# Patient Record
Sex: FEMALE | RURAL
[De-identification: names, ages, dates, MRNs, and addresses within clinical notes are randomized per-mention and may not be internally consistent; named-entity substitution may affect disease eponyms.]

---

## 2023-08-29 ENCOUNTER — HOSPITAL ENCOUNTER (EMERGENCY)
Facility: HOSPITAL | Age: 38
Discharge: ANOTHER HOSPITAL | End: 2023-08-30
Attending: EMERGENCY MEDICINE
Payer: COMMERCIAL

## 2023-08-29 DIAGNOSIS — K83.1 OBSTRUCTIVE JAUNDICE: Primary | ICD-10-CM

## 2023-08-29 PROBLEM — K80.01 CALCULUS OF GALLBLADDER WITH ACUTE CHOLECYSTITIS AND OBSTRUCTION: Status: ACTIVE | Noted: 2023-08-29

## 2023-08-29 PROBLEM — K80.42 CHOLEDOCHOLITHIASIS WITH ACUTE CHOLECYSTITIS: Status: ACTIVE | Noted: 2023-08-29

## 2023-08-29 LAB
ALBUMIN SERPL BCP-MCNC: 3.4 G/DL (ref 3.5–5)
ALBUMIN/GLOB SERPL: 0.8 {RATIO}
ALP SERPL-CCNC: 272 U/L (ref 37–98)
ALT SERPL W P-5'-P-CCNC: 396 U/L (ref 13–56)
AMYLASE SERPL-CCNC: 34 U/L (ref 25–115)
ANION GAP SERPL CALCULATED.3IONS-SCNC: 18 MMOL/L (ref 7–16)
AST SERPL W P-5'-P-CCNC: 125 U/L (ref 15–37)
B-HCG UR QL: NEGATIVE
BACTERIA #/AREA URNS HPF: ABNORMAL /HPF
BASOPHILS # BLD AUTO: 0.03 K/UL (ref 0–0.2)
BASOPHILS NFR BLD AUTO: 0.4 % (ref 0–1)
BILIRUB SERPL-MCNC: 5.8 MG/DL (ref ?–1.2)
BILIRUB UR QL STRIP: 2
BUN SERPL-MCNC: 16 MG/DL (ref 7–18)
BUN/CREAT SERPL: 20 (ref 6–20)
CALCIUM SERPL-MCNC: 9.7 MG/DL (ref 8.5–10.1)
CHLORIDE SERPL-SCNC: 103 MMOL/L (ref 98–107)
CLARITY UR: CLEAR
CO2 SERPL-SCNC: 21 MMOL/L (ref 21–32)
COLOR UR: ABNORMAL
CREAT SERPL-MCNC: 0.8 MG/DL (ref 0.55–1.02)
CTP QC/QA: YES
DIFFERENTIAL METHOD BLD: ABNORMAL
EGFR (NO RACE VARIABLE) (RUSH/TITUS): 99 ML/MIN/1.73M2
EOSINOPHIL # BLD AUTO: 0.09 K/UL (ref 0–0.5)
EOSINOPHIL NFR BLD AUTO: 1.3 % (ref 1–4)
ERYTHROCYTE [DISTWIDTH] IN BLOOD BY AUTOMATED COUNT: 12.8 % (ref 11.5–14.5)
GLOBULIN SER-MCNC: 4.5 G/DL (ref 2–4)
GLUCOSE SERPL-MCNC: 99 MG/DL (ref 74–106)
GLUCOSE UR STRIP-MCNC: NORMAL MG/DL
HCT VFR BLD AUTO: 38.7 % (ref 38–47)
HGB BLD-MCNC: 13.3 G/DL (ref 12–16)
IMM GRANULOCYTES # BLD AUTO: 0.03 K/UL (ref 0–0.04)
IMM GRANULOCYTES NFR BLD: 0.4 % (ref 0–0.4)
KETONES UR STRIP-SCNC: 20 MG/DL
LEUKOCYTE ESTERASE UR QL STRIP: ABNORMAL
LIPASE SERPL-CCNC: 31 U/L (ref 73–393)
LYMPHOCYTES # BLD AUTO: 1.49 K/UL (ref 1–4.8)
LYMPHOCYTES NFR BLD AUTO: 20.7 % (ref 27–41)
MCH RBC QN AUTO: 29.5 PG (ref 27–31)
MCHC RBC AUTO-ENTMCNC: 34.4 G/DL (ref 32–36)
MCV RBC AUTO: 85.8 FL (ref 80–96)
MONOCYTES # BLD AUTO: 0.55 K/UL (ref 0–0.8)
MONOCYTES NFR BLD AUTO: 7.6 % (ref 2–6)
MPC BLD CALC-MCNC: 10.2 FL (ref 9.4–12.4)
MUCOUS, UA: ABNORMAL /LPF
NEUTROPHILS # BLD AUTO: 5.01 K/UL (ref 1.8–7.7)
NEUTROPHILS NFR BLD AUTO: 69.6 % (ref 53–65)
NITRITE UR QL STRIP: NEGATIVE
NRBC # BLD AUTO: 0 X10E3/UL
NRBC, AUTO (.00): 0 %
PH UR STRIP: 6 PH UNITS
PLATELET # BLD AUTO: 260 K/UL (ref 150–400)
POTASSIUM SERPL-SCNC: 3.5 MMOL/L (ref 3.5–5.1)
PROT SERPL-MCNC: 7.9 G/DL (ref 6.4–8.2)
PROT UR QL STRIP: NEGATIVE
RBC # BLD AUTO: 4.51 M/UL (ref 4.2–5.4)
RBC # UR STRIP: NEGATIVE /UL
RBC #/AREA URNS HPF: 3 /HPF
SODIUM SERPL-SCNC: 138 MMOL/L (ref 136–145)
SP GR UR STRIP: 1.01
SQUAMOUS #/AREA URNS LPF: ABNORMAL /HPF
UROBILINOGEN UR STRIP-ACNC: NORMAL MG/DL
WBC # BLD AUTO: 7.2 K/UL (ref 4.5–11)
WBC #/AREA URNS HPF: 3 /HPF

## 2023-08-29 PROCEDURE — 99284 PR EMERGENCY DEPT VISIT,LEVEL IV: ICD-10-PCS | Mod: ,,, | Performed by: SURGERY

## 2023-08-29 PROCEDURE — 87086 URINE CULTURE/COLONY COUNT: CPT | Performed by: NURSE PRACTITIONER

## 2023-08-29 PROCEDURE — 99285 EMERGENCY DEPT VISIT HI MDM: CPT | Mod: ,,, | Performed by: EMERGENCY MEDICINE

## 2023-08-29 PROCEDURE — 82150 ASSAY OF AMYLASE: CPT | Performed by: NURSE PRACTITIONER

## 2023-08-29 PROCEDURE — 25000003 PHARM REV CODE 250: Performed by: NURSE PRACTITIONER

## 2023-08-29 PROCEDURE — 63600175 PHARM REV CODE 636 W HCPCS: Performed by: NURSE PRACTITIONER

## 2023-08-29 PROCEDURE — 96376 TX/PRO/DX INJ SAME DRUG ADON: CPT

## 2023-08-29 PROCEDURE — 81001 URINALYSIS AUTO W/SCOPE: CPT | Performed by: NURSE PRACTITIONER

## 2023-08-29 PROCEDURE — 83690 ASSAY OF LIPASE: CPT | Performed by: NURSE PRACTITIONER

## 2023-08-29 PROCEDURE — 96361 HYDRATE IV INFUSION ADD-ON: CPT

## 2023-08-29 PROCEDURE — 99284 EMERGENCY DEPT VISIT MOD MDM: CPT | Mod: ,,, | Performed by: SURGERY

## 2023-08-29 PROCEDURE — 96365 THER/PROPH/DIAG IV INF INIT: CPT

## 2023-08-29 PROCEDURE — 99285 EMERGENCY DEPT VISIT HI MDM: CPT | Mod: 25

## 2023-08-29 PROCEDURE — 81025 URINE PREGNANCY TEST: CPT | Performed by: NURSE PRACTITIONER

## 2023-08-29 PROCEDURE — 99285 PR EMERGENCY DEPT VISIT,LEVEL V: ICD-10-PCS | Mod: ,,, | Performed by: EMERGENCY MEDICINE

## 2023-08-29 PROCEDURE — 85025 COMPLETE CBC W/AUTO DIFF WBC: CPT | Performed by: NURSE PRACTITIONER

## 2023-08-29 PROCEDURE — 96375 TX/PRO/DX INJ NEW DRUG ADDON: CPT

## 2023-08-29 PROCEDURE — 80053 COMPREHEN METABOLIC PANEL: CPT | Performed by: NURSE PRACTITIONER

## 2023-08-29 RX ORDER — SODIUM CHLORIDE 9 MG/ML
1000 INJECTION, SOLUTION INTRAVENOUS
Status: COMPLETED | OUTPATIENT
Start: 2023-08-29 | End: 2023-08-30

## 2023-08-29 RX ORDER — MORPHINE SULFATE 4 MG/ML
4 INJECTION, SOLUTION INTRAMUSCULAR; INTRAVENOUS
Status: COMPLETED | OUTPATIENT
Start: 2023-08-29 | End: 2023-08-29

## 2023-08-29 RX ORDER — ONDANSETRON 2 MG/ML
4 INJECTION INTRAMUSCULAR; INTRAVENOUS
Status: COMPLETED | OUTPATIENT
Start: 2023-08-29 | End: 2023-08-29

## 2023-08-29 RX ORDER — CITALOPRAM 20 MG/1
20 TABLET, FILM COATED ORAL DAILY
COMMUNITY

## 2023-08-29 RX ORDER — DEXTROAMPHETAMINE SACCHARATE, AMPHETAMINE ASPARTATE MONOHYDRATE, DEXTROAMPHETAMINE SULFATE AND AMPHETAMINE SULFATE 7.5; 7.5; 7.5; 7.5 MG/1; MG/1; MG/1; MG/1
20 CAPSULE, EXTENDED RELEASE ORAL 3 TIMES DAILY
COMMUNITY

## 2023-08-29 RX ADMIN — ONDANSETRON 4 MG: 2 INJECTION INTRAMUSCULAR; INTRAVENOUS at 02:08

## 2023-08-29 RX ADMIN — SODIUM CHLORIDE 1000 ML: 9 INJECTION, SOLUTION INTRAVENOUS at 07:08

## 2023-08-29 RX ADMIN — MORPHINE SULFATE 4 MG: 4 INJECTION, SOLUTION INTRAMUSCULAR; INTRAVENOUS at 02:08

## 2023-08-29 RX ADMIN — ONDANSETRON 4 MG: 2 INJECTION INTRAMUSCULAR; INTRAVENOUS at 08:08

## 2023-08-29 RX ADMIN — MORPHINE SULFATE 4 MG: 4 INJECTION, SOLUTION INTRAMUSCULAR; INTRAVENOUS at 06:08

## 2023-08-29 RX ADMIN — PIPERACILLIN AND TAZOBACTAM 4.5 G: 4; .5 INJECTION, POWDER, FOR SOLUTION INTRAVENOUS; PARENTERAL at 07:08

## 2023-08-29 RX ADMIN — SODIUM CHLORIDE 1000 ML: 9 INJECTION, SOLUTION INTRAVENOUS at 02:08

## 2023-08-29 NOTE — ED PROVIDER NOTES
Encounter Date: 8/29/2023       History     Chief Complaint   Patient presents with    Abdominal Pain     Patient presents to the ER with complaint of right upper quadrant pain.  Patient reports nausea but denies vomiting or diarrhea.  She reports she has history of constipation but reports normal bowel movement yesterday.  She denies fever.  The pain is intermittent.  She describes the pain as a sharp pain in the right upper quadrant that radiates through to her back.  She states when she woke up this morning, she noticed the sclerae of her eyes were yellow.      The history is provided by the patient. No  was used.     Review of patient's allergies indicates:  No Known Allergies  Past Medical History:   Diagnosis Date    ADHD     Depression     Migraine headache      Past Surgical History:   Procedure Laterality Date    TONSILLECTOMY       History reviewed. No pertinent family history.  Social History     Tobacco Use    Smoking status: Never    Smokeless tobacco: Never   Substance Use Topics    Alcohol use: Not Currently     Comment: occ    Drug use: Never     Review of Systems   Constitutional:  Positive for activity change, appetite change and fatigue.   Eyes:         Sclera noted yellow bilateral   Gastrointestinal:  Positive for abdominal pain, constipation and nausea.   All other systems reviewed and are negative.      Physical Exam     Initial Vitals [08/29/23 1403]   BP Pulse Resp Temp SpO2   (!) 157/111 (!) 128 16 98.1 °F (36.7 °C) 98 %      MAP       --         Physical Exam    Nursing note and vitals reviewed.  Constitutional: She appears well-developed and well-nourished.   HENT:   Head: Normocephalic.   Right Ear: External ear normal.   Left Ear: External ear normal.   Nose: Nose normal.   Mouth/Throat: Oropharynx is clear and moist.   Eyes: Conjunctivae and EOM are normal. Pupils are equal, round, and reactive to light. Scleral icterus is present.   Neck: Neck supple.   Normal  range of motion.  Cardiovascular:  Normal rate, regular rhythm, normal heart sounds and intact distal pulses.           Pulmonary/Chest: Breath sounds normal.   Abdominal: Abdomen is soft and protuberant. Bowel sounds are normal. There is abdominal tenderness in the right upper quadrant and epigastric area. There is positive Schuster's sign.   Musculoskeletal:         General: Normal range of motion.      Cervical back: Normal range of motion and neck supple.     Neurological: She is alert and oriented to person, place, and time. She has normal strength. GCS score is 15. GCS eye subscore is 4. GCS verbal subscore is 5. GCS motor subscore is 6.   Skin: Skin is warm and dry. Capillary refill takes less than 2 seconds.   Psychiatric: She has a normal mood and affect. Her behavior is normal. Judgment and thought content normal.         Medical Screening Exam   See Full Note    ED Course   Procedures  Labs Reviewed   URINALYSIS, REFLEX TO URINE CULTURE - Abnormal; Notable for the following components:       Result Value    Leukocytes, UA Small (*)     Ketones, UA 20 (*)     Bilirubin, UA 2 (*)     All other components within normal limits   LIPASE - Abnormal; Notable for the following components:    Lipase 31 (*)     All other components within normal limits   COMPREHENSIVE METABOLIC PANEL - Abnormal; Notable for the following components:    Anion Gap 18 (*)     Albumin 3.4 (*)     Globulin 4.5 (*)     Bilirubin, Total 5.8 (*)     Alk Phos 272 (*)      (*)      (*)     All other components within normal limits   CBC WITH DIFFERENTIAL - Abnormal; Notable for the following components:    Neutrophils % 69.6 (*)     Lymphocytes % 20.7 (*)     Monocytes % 7.6 (*)     All other components within normal limits   URINALYSIS, MICROSCOPIC - Abnormal; Notable for the following components:    Bacteria, UA Moderate (*)     Squamous Epithelial Cells, UA Few (*)     Mucous Occasional (*)     All other components within  normal limits   AMYLASE - Normal   CULTURE, URINE   CBC W/ AUTO DIFFERENTIAL    Narrative:     The following orders were created for panel order CBC auto differential.  Procedure                               Abnormality         Status                     ---------                               -----------         ------                     CBC with Differential[[id number]]        Abnormal            Final result                 Please view results for these tests on the individual orders.   CBC W/ AUTO DIFFERENTIAL    Narrative:     The following orders were created for panel order CBC auto differential.  Procedure                               Abnormality         Status                     ---------                               -----------         ------                     CBC with Differential[[id number]]                                                         Please view results for these tests on the individual orders.   CBC WITH DIFFERENTIAL   COMPREHENSIVE METABOLIC PANEL   POCT URINE PREGNANCY          Imaging Results              US Abdomen Limited_Gallbladder (Final result)  Result time 08/29/23 15:31:28      Final result by Artur Rios II, MD (08/29/23 15:31:28)                   Impression:      Cholelithiasis with slightly thickened wall could indicate cholecystitis.  Bile duct is also slightly distended and distal biliary duct calculus cannot be excluded.  No other evidence of abnormality demonstrated.    Ultrasound images stored and captured.      Electronically signed by: Artur Rios  Date:    08/29/2023  Time:    15:31               Narrative:    EXAMINATION:  US ABDOMEN LIMITED_GALLBLADDER    CLINICAL HISTORY:  right upper quadrant pain;    TECHNIQUE:  Grayscale and color Doppler imaging performed.    COMPARISON:  None.    FINDINGS:  The liver is normal in size and echogenicity. The gallbladder has multiple calculi.  The gallbladder wall thickness is 4.1 mm . The common bile duct  measures 6.6 mm.    The visualized portion of the pancreas appear within normal limits    The right kidney is normal in size and echogenicity and measures 10.54 cm .    No free fluid or free air seen.                                       Medications   piperacillin-tazobactam (ZOSYN) 4.5 g in dextrose 5 % in water (D5W) 100 mL IVPB (MB+) (0 g Intravenous Stopped 8/30/23 0209)   morphine injection 4 mg (4 mg Intravenous Given 8/30/23 0104)   ondansetron injection 4 mg (4 mg Intravenous Given 8/30/23 0103)   0.9%  NaCl infusion (1,000 mLs Intravenous New Bag 8/30/23 0406)   sodium chloride 0.9% bolus 1,000 mL 1,000 mL (0 mLs Intravenous Stopped 8/29/23 1623)   morphine injection 4 mg (4 mg Intravenous Given 8/29/23 1453)   ondansetron injection 4 mg (4 mg Intravenous Given 8/29/23 1453)   morphine injection 4 mg (4 mg Intravenous Given 8/29/23 1820)   0.9%  NaCl infusion (0 mLs Intravenous Stopped 8/30/23 0402)   piperacillin-tazobactam (ZOSYN) 4.5 g in dextrose 5 % in water (D5W) 100 mL IVPB (MB+) (0 g Intravenous Stopped 8/29/23 2104)   ondansetron injection 4 mg (4 mg Intravenous Given 8/29/23 2017)   promethazine (PHENERGAN) 25 mg in dextrose 5 % (D5W) 50 mL IVPB (0 mg Intravenous Stopped 8/30/23 0432)     Medical Decision Making  Patient presents to the ER with complaint of right upper quadrant pain.  Patient reports nausea but denies vomiting or diarrhea.  She reports she has history of constipation but reports normal bowel movement yesterday.  She denies fever.  The pain is intermittent.  She describes the pain as a sharp pain in the right upper quadrant that radiates through to her back.  She states when she woke up this morning, she noticed the sclerae of her eyes were yellow.        Amount and/or Complexity of Data Reviewed  Labs: ordered.     Details: Elevated liver enzymes and billirubin  Radiology: ordered.     Details: Gallbladder ultrasound:Cholelithiasis with slightly thickened wall could indicate  cholecystitis.  Bile duct is also slightly distended and distal biliary duct calculus cannot be excluded.  No other evidence of abnormality demonstrated.  Discussion of management or test interpretation with external provider(s): Initiate IV, collect lab work  1 L normal saline bolus  4 mg morphine IV treat right upper quadrant abdominal pain  4 mg Zofran IV to treat nausea    Dr. Fernandez called in consult recommend transfer related to elevated bilirubin and distended duct on gallbladder ultrasound.  Needs service for ERCP.    Dr. Fernandez evaluated patient in the ER, order placed for MRCP in a.m., CBC and CMP in a.m. IV fluids at 125 mL per mL/hr, morphine and Zofran ordered for pain and nausea these orders were placed pending patient does not get transferred to facility with the ERCP availability.  There are no transfer availability  Will see patient in a.m. after MRCP.    12:55 a.m. kicked care transferred to Dr Fierro.  4:34 a.m. the patient is accepted by  (gastroenterology) at [facility].    Risk  Prescription drug management.                               Clinical Impression:   Final diagnoses:  [K83.1] Obstructive jaundice (Primary)        ED Disposition Condition    Transfer to Another Facility Gerald Vaughn MD  08/30/23 2166

## 2023-08-30 VITALS
OXYGEN SATURATION: 100 % | RESPIRATION RATE: 18 BRPM | DIASTOLIC BLOOD PRESSURE: 83 MMHG | WEIGHT: 227 LBS | BODY MASS INDEX: 34.4 KG/M2 | HEIGHT: 68 IN | SYSTOLIC BLOOD PRESSURE: 155 MMHG | HEART RATE: 84 BPM | TEMPERATURE: 98 F

## 2023-08-30 PROCEDURE — 25000003 PHARM REV CODE 250: Performed by: NURSE PRACTITIONER

## 2023-08-30 PROCEDURE — 96361 HYDRATE IV INFUSION ADD-ON: CPT

## 2023-08-30 PROCEDURE — 63600175 PHARM REV CODE 636 W HCPCS: Performed by: NURSE PRACTITIONER

## 2023-08-30 PROCEDURE — 25000003 PHARM REV CODE 250: Performed by: EMERGENCY MEDICINE

## 2023-08-30 PROCEDURE — 63600175 PHARM REV CODE 636 W HCPCS: Performed by: EMERGENCY MEDICINE

## 2023-08-30 PROCEDURE — 96366 THER/PROPH/DIAG IV INF ADDON: CPT

## 2023-08-30 PROCEDURE — 96367 TX/PROPH/DG ADDL SEQ IV INF: CPT

## 2023-08-30 PROCEDURE — 96376 TX/PRO/DX INJ SAME DRUG ADON: CPT

## 2023-08-30 RX ORDER — SODIUM CHLORIDE 9 MG/ML
1000 INJECTION, SOLUTION INTRAVENOUS CONTINUOUS
Status: DISCONTINUED | OUTPATIENT
Start: 2023-08-30 | End: 2023-08-30 | Stop reason: HOSPADM

## 2023-08-30 RX ORDER — ONDANSETRON 2 MG/ML
4 INJECTION INTRAMUSCULAR; INTRAVENOUS EVERY 6 HOURS PRN
Status: DISCONTINUED | OUTPATIENT
Start: 2023-08-30 | End: 2023-08-30 | Stop reason: HOSPADM

## 2023-08-30 RX ORDER — MORPHINE SULFATE 4 MG/ML
4 INJECTION, SOLUTION INTRAMUSCULAR; INTRAVENOUS EVERY 6 HOURS PRN
Status: DISCONTINUED | OUTPATIENT
Start: 2023-08-30 | End: 2023-08-30 | Stop reason: HOSPADM

## 2023-08-30 RX ADMIN — PIPERACILLIN AND TAZOBACTAM 4.5 G: 4; .5 INJECTION, POWDER, FOR SOLUTION INTRAVENOUS; PARENTERAL at 01:08

## 2023-08-30 RX ADMIN — ONDANSETRON 4 MG: 2 INJECTION INTRAMUSCULAR; INTRAVENOUS at 01:08

## 2023-08-30 RX ADMIN — MORPHINE SULFATE 4 MG: 4 INJECTION, SOLUTION INTRAMUSCULAR; INTRAVENOUS at 01:08

## 2023-08-30 RX ADMIN — SODIUM CHLORIDE 1000 ML: 9 INJECTION, SOLUTION INTRAVENOUS at 04:08

## 2023-08-30 RX ADMIN — PROMETHAZINE HYDROCHLORIDE 25 MG: 25 INJECTION INTRAMUSCULAR; INTRAVENOUS at 04:08

## 2023-08-30 NOTE — CONSULTS
[facility] - Emergency Department  General Surgery  Consult Note    Patient Name: Laura Colby  MRN: [id number]  Code Status: No Order  Admission Date: 8/29/2023  Hospital Length of Stay: 0 days  Attending Physician: No att. providers found  Primary Care Provider: No, Primary Doctor    Patient information was obtained from patient, relative(s) and ER records.     Consults  Subjective:     Principal Problem: <principal problem not specified>    History of Present Illness: 37-year-old white female patient presents to the ER with abdominal pain the upper abdomen, right-sided for the past 2-3 days.  She reports that she has had nausea as well.  The patient thought that she noticed jaundice center eyes and skin today and that led her to come to the emergency department.  An ultrasound was performed revealing cholelithiasis with possible gallbladder wall thickening and dilated common bile duct.  There was suspicion for a stone within the duct, but this was not confirmatory.  The patient was noted to have a bilirubin above 5 well as elevated transaminases and alkaline phosphatase.  Initially informed the nurse practitioner, Malathi Romero, that the patient should be transferred for ERCP.  However after several hours, she has been unable to secure an admitting/accepting facility.  General surgery was asked for more input regarding management given the difficulty in transfer.      No current facility-administered medications on file prior to encounter.     Current Outpatient Medications on File Prior to Encounter   Medication Sig    citalopram (CELEXA) 20 MG tablet Take 20 mg by mouth once daily.    dextroamphetamine-amphetamine (ADDERALL XR) 30 MG 24 hr capsule Take 20 mg by mouth 3 (three) times daily.       Review of patient's allergies indicates:  No Known Allergies    No past medical history on file.  No past surgical history on file.  Family History    None       Tobacco Use    Smoking status: Not on file     Smokeless tobacco: Not on file   Substance and Sexual Activity    Alcohol use: Not on file    Drug use: Not on file    Sexual activity: Not on file     Review of Systems   All other systems reviewed and are negative.    Objective:     Vital Signs (Most Recent):  Temp: 98.3 °F (36.8 °C) (08/29/23 1754)  Pulse: (!) 116 (08/29/23 1754)  Resp: 20 (08/29/23 1820)  BP: (!) 144/91 (08/29/23 1754)  SpO2: 96 % (08/29/23 1754) Vital Signs (24h Range):  Temp:  [98.1 °F (36.7 °C)-98.4 °F (36.9 °C)] 98.3 °F (36.8 °C)  Pulse:  [116-128] 116  Resp:  [16-20] 20  SpO2:  [96 %-100 %] 96 %  BP: (144-168)/() 144/91     Weight: 103 kg (227 lb)  Body mass index is 34.52 kg/m².     Physical Exam  Eyes:      General: Scleral icterus present.   Cardiovascular:      Pulses: Normal pulses.   Pulmonary:      Effort: Pulmonary effort is normal.   Abdominal:      Palpations: Abdomen is soft.      Tenderness: There is abdominal tenderness.   Skin:     Coloration: Skin is jaundiced.   Neurological:      General: No focal deficit present.      Mental Status: She is alert.   Psychiatric:         Mood and Affect: Mood normal.            I have reviewed all pertinent lab results within the past 24 hours.  CBC:   Recent Labs   Lab 08/29/23  1453   WBC 7.20   RBC 4.51   HGB 13.3   HCT 38.7      MCV 85.8   MCH 29.5   MCHC 34.4     CMP:   Recent Labs   Lab 08/29/23  1453   GLU 99   CALCIUM 9.7   ALBUMIN 3.4*   PROT 7.9      K 3.5   CO2 21      BUN 16   CREATININE 0.80   ALKPHOS 272*   *   *   BILITOT 5.8*       Significant Diagnostics:  I have reviewed all pertinent imaging results/findings within the past 24 hours.  U/S: I have reviewed all pertinent results/findings within the past 24 hours and my personal findings are:  Per HPI      Assessment/Plan:     Choledocholithiasis with acute cholecystitis  I spoke to the patient and informed her that if her bilirubin goes down, or if we are simply unable to find  her a facility for ERCP then we would consider cholecystectomy, possibly with spyglass technology and at least intraoperative cholangiogram prior to transferring to another facility for ERCP if still necessary.  For now we will keep her in the emergency department and repeat LFTs in the morning, unless she is accepted for ERCP prior to that.  Start IV antibiotics and Continue IV hydration.   Okay for ice chips and sips of water this point.    Calculus of gallbladder with acute cholecystitis and obstruction  Plan lap edward tomorrow if unable to transfer for preoperative ERCP.  Concomitant intraoperative cholangiogram with potential spyglass for stone removal will also be entertained.      VTE Risk Mitigation (From admission, onward)    None          Thank you for your consult. I will follow-up with patient. Please contact us if you have any additional questions.    Lukas Fernandez MD  General Surgery  [facility] - Emergency Department

## 2023-08-30 NOTE — ASSESSMENT & PLAN NOTE
Plan lap edward tomorrow if unable to transfer for preoperative ERCP.  Concomitant intraoperative cholangiogram with potential spyglass for stone removal will also be entertained.

## 2023-08-30 NOTE — ASSESSMENT & PLAN NOTE
I spoke to the patient and informed her that if her bilirubin goes down, or if we are simply unable to find her a facility for ERCP then we would consider cholecystectomy, possibly with spyglass technology and at least intraoperative cholangiogram prior to transferring to another facility for ERCP if still necessary.  For now we will keep her in the emergency department and repeat LFTs in the morning, unless she is accepted for ERCP prior to that.  Start IV antibiotics and Continue IV hydration.   Okay for ice chips and sips of water this point.

## 2023-08-30 NOTE — SUBJECTIVE & OBJECTIVE
No current facility-administered medications on file prior to encounter.     Current Outpatient Medications on File Prior to Encounter   Medication Sig    citalopram (CELEXA) 20 MG tablet Take 20 mg by mouth once daily.    dextroamphetamine-amphetamine (ADDERALL XR) 30 MG 24 hr capsule Take 20 mg by mouth 3 (three) times daily.       Review of patient's allergies indicates:  No Known Allergies    No past medical history on file.  No past surgical history on file.  Family History    None       Tobacco Use    Smoking status: Not on file    Smokeless tobacco: Not on file   Substance and Sexual Activity    Alcohol use: Not on file    Drug use: Not on file    Sexual activity: Not on file     Review of Systems   All other systems reviewed and are negative.    Objective:     Vital Signs (Most Recent):  Temp: 98.3 °F (36.8 °C) (08/29/23 1754)  Pulse: (!) 116 (08/29/23 1754)  Resp: 20 (08/29/23 1820)  BP: (!) 144/91 (08/29/23 1754)  SpO2: 96 % (08/29/23 1754) Vital Signs (24h Range):  Temp:  [98.1 °F (36.7 °C)-98.4 °F (36.9 °C)] 98.3 °F (36.8 °C)  Pulse:  [116-128] 116  Resp:  [16-20] 20  SpO2:  [96 %-100 %] 96 %  BP: (144-168)/() 144/91     Weight: 103 kg (227 lb)  Body mass index is 34.52 kg/m².     Physical Exam  Eyes:      General: Scleral icterus present.   Cardiovascular:      Pulses: Normal pulses.   Pulmonary:      Effort: Pulmonary effort is normal.   Abdominal:      Palpations: Abdomen is soft.      Tenderness: There is abdominal tenderness.   Skin:     Coloration: Skin is jaundiced.   Neurological:      General: No focal deficit present.      Mental Status: She is alert.   Psychiatric:         Mood and Affect: Mood normal.            I have reviewed all pertinent lab results within the past 24 hours.  CBC:   Recent Labs   Lab 08/29/23  1453   WBC 7.20   RBC 4.51   HGB 13.3   HCT 38.7      MCV 85.8   MCH 29.5   MCHC 34.4     CMP:   Recent Labs   Lab 08/29/23  1453   GLU 99   CALCIUM 9.7   ALBUMIN 3.4*    PROT 7.9      K 3.5   CO2 21      BUN 16   CREATININE 0.80   ALKPHOS 272*   *   *   BILITOT 5.8*       Significant Diagnostics:  I have reviewed all pertinent imaging results/findings within the past 24 hours.  U/S: I have reviewed all pertinent results/findings within the past 24 hours and my personal findings are:  Per HPI

## 2023-08-30 NOTE — HPI
37-year-old white female patient presents to the ER with abdominal pain the upper abdomen, right-sided for the past 2-3 days.  She reports that she has had nausea as well.  The patient thought that she noticed jaundice center eyes and skin today and that led her to come to the emergency department.  An ultrasound was performed revealing cholelithiasis with possible gallbladder wall thickening and dilated common bile duct.  There was suspicion for a stone within the duct, but this was not confirmatory.  The patient was noted to have a bilirubin above 5 well as elevated transaminases and alkaline phosphatase.  Initially informed the nurse practitioner, Malathi Romero, that the patient should be transferred for ERCP.  However after several hours, she has been unable to secure an admitting/accepting facility.  General surgery was asked for more input regarding management given the difficulty in transfer.

## 2023-08-31 LAB — UA COMPLETE W REFLEX CULTURE PNL UR: NO GROWTH
